# Patient Record
Sex: MALE | Race: WHITE | NOT HISPANIC OR LATINO | URBAN - METROPOLITAN AREA
[De-identification: names, ages, dates, MRNs, and addresses within clinical notes are randomized per-mention and may not be internally consistent; named-entity substitution may affect disease eponyms.]

---

## 2020-04-28 ENCOUNTER — EMERGENCY (EMERGENCY)
Facility: HOSPITAL | Age: 35
LOS: 0 days | Discharge: HOME | End: 2020-04-28
Attending: EMERGENCY MEDICINE
Payer: COMMERCIAL

## 2020-04-28 VITALS
SYSTOLIC BLOOD PRESSURE: 145 MMHG | WEIGHT: 175.05 LBS | RESPIRATION RATE: 18 BRPM | OXYGEN SATURATION: 97 % | HEART RATE: 85 BPM | TEMPERATURE: 98 F | DIASTOLIC BLOOD PRESSURE: 79 MMHG

## 2020-04-28 VITALS
TEMPERATURE: 98 F | HEART RATE: 62 BPM | OXYGEN SATURATION: 98 % | RESPIRATION RATE: 18 BRPM | SYSTOLIC BLOOD PRESSURE: 131 MMHG | DIASTOLIC BLOOD PRESSURE: 71 MMHG

## 2020-04-28 DIAGNOSIS — F41.9 ANXIETY DISORDER, UNSPECIFIED: ICD-10-CM

## 2020-04-28 DIAGNOSIS — R06.02 SHORTNESS OF BREATH: ICD-10-CM

## 2020-04-28 DIAGNOSIS — R06.89 OTHER ABNORMALITIES OF BREATHING: ICD-10-CM

## 2020-04-28 DIAGNOSIS — Z79.1 LONG TERM (CURRENT) USE OF NON-STEROIDAL ANTI-INFLAMMATORIES (NSAID): ICD-10-CM

## 2020-04-28 LAB
ALBUMIN SERPL ELPH-MCNC: 4.6 G/DL — SIGNIFICANT CHANGE UP (ref 3.3–5)
ALP SERPL-CCNC: 35 U/L — LOW (ref 40–120)
ALT FLD-CCNC: 29 U/L — SIGNIFICANT CHANGE UP (ref 12–78)
ANION GAP SERPL CALC-SCNC: 5 MMOL/L — SIGNIFICANT CHANGE UP (ref 5–17)
APTT BLD: 29.4 SEC — SIGNIFICANT CHANGE UP (ref 27.5–36.3)
AST SERPL-CCNC: 15 U/L — SIGNIFICANT CHANGE UP (ref 15–37)
BASOPHILS # BLD AUTO: 0.04 K/UL — SIGNIFICANT CHANGE UP (ref 0–0.2)
BASOPHILS NFR BLD AUTO: 0.5 % — SIGNIFICANT CHANGE UP (ref 0–2)
BILIRUB SERPL-MCNC: 0.6 MG/DL — SIGNIFICANT CHANGE UP (ref 0.2–1.2)
BUN SERPL-MCNC: 19 MG/DL — SIGNIFICANT CHANGE UP (ref 7–23)
CALCIUM SERPL-MCNC: 9.5 MG/DL — SIGNIFICANT CHANGE UP (ref 8.5–10.1)
CHLORIDE SERPL-SCNC: 105 MMOL/L — SIGNIFICANT CHANGE UP (ref 96–108)
CO2 SERPL-SCNC: 30 MMOL/L — SIGNIFICANT CHANGE UP (ref 22–31)
CREAT SERPL-MCNC: 0.97 MG/DL — SIGNIFICANT CHANGE UP (ref 0.5–1.3)
D DIMER BLD IA.RAPID-MCNC: 153 NG/ML DDU — SIGNIFICANT CHANGE UP
EOSINOPHIL # BLD AUTO: 0.09 K/UL — SIGNIFICANT CHANGE UP (ref 0–0.5)
EOSINOPHIL NFR BLD AUTO: 1.2 % — SIGNIFICANT CHANGE UP (ref 0–6)
GLUCOSE SERPL-MCNC: 90 MG/DL — SIGNIFICANT CHANGE UP (ref 70–99)
HCT VFR BLD CALC: 43.1 % — SIGNIFICANT CHANGE UP (ref 39–50)
HGB BLD-MCNC: 14.5 G/DL — SIGNIFICANT CHANGE UP (ref 13–17)
IMM GRANULOCYTES NFR BLD AUTO: 0.3 % — SIGNIFICANT CHANGE UP (ref 0–1.5)
INR BLD: 1.16 RATIO — SIGNIFICANT CHANGE UP (ref 0.88–1.16)
LYMPHOCYTES # BLD AUTO: 1.39 K/UL — SIGNIFICANT CHANGE UP (ref 1–3.3)
LYMPHOCYTES # BLD AUTO: 18.5 % — SIGNIFICANT CHANGE UP (ref 13–44)
MCHC RBC-ENTMCNC: 29.8 PG — SIGNIFICANT CHANGE UP (ref 27–34)
MCHC RBC-ENTMCNC: 33.6 GM/DL — SIGNIFICANT CHANGE UP (ref 32–36)
MCV RBC AUTO: 88.7 FL — SIGNIFICANT CHANGE UP (ref 80–100)
MONOCYTES # BLD AUTO: 0.64 K/UL — SIGNIFICANT CHANGE UP (ref 0–0.9)
MONOCYTES NFR BLD AUTO: 8.5 % — SIGNIFICANT CHANGE UP (ref 2–14)
NEUTROPHILS # BLD AUTO: 5.34 K/UL — SIGNIFICANT CHANGE UP (ref 1.8–7.4)
NEUTROPHILS NFR BLD AUTO: 71 % — SIGNIFICANT CHANGE UP (ref 43–77)
PLATELET # BLD AUTO: 172 K/UL — SIGNIFICANT CHANGE UP (ref 150–400)
POTASSIUM SERPL-MCNC: 3.9 MMOL/L — SIGNIFICANT CHANGE UP (ref 3.5–5.3)
POTASSIUM SERPL-SCNC: 3.9 MMOL/L — SIGNIFICANT CHANGE UP (ref 3.5–5.3)
PROT SERPL-MCNC: 7.7 GM/DL — SIGNIFICANT CHANGE UP (ref 6–8.3)
PROTHROM AB SERPL-ACNC: 12.9 SEC — SIGNIFICANT CHANGE UP (ref 10–12.9)
RBC # BLD: 4.86 M/UL — SIGNIFICANT CHANGE UP (ref 4.2–5.8)
RBC # FLD: 12.4 % — SIGNIFICANT CHANGE UP (ref 10.3–14.5)
SARS-COV-2 RNA SPEC QL NAA+PROBE: SIGNIFICANT CHANGE UP
SODIUM SERPL-SCNC: 140 MMOL/L — SIGNIFICANT CHANGE UP (ref 135–145)
TROPONIN I SERPL-MCNC: <0.015 NG/ML — SIGNIFICANT CHANGE UP (ref 0.01–0.04)
WBC # BLD: 7.52 K/UL — SIGNIFICANT CHANGE UP (ref 3.8–10.5)
WBC # FLD AUTO: 7.52 K/UL — SIGNIFICANT CHANGE UP (ref 3.8–10.5)

## 2020-04-28 PROCEDURE — 93010 ELECTROCARDIOGRAM REPORT: CPT

## 2020-04-28 PROCEDURE — 36415 COLL VENOUS BLD VENIPUNCTURE: CPT

## 2020-04-28 PROCEDURE — 85379 FIBRIN DEGRADATION QUANT: CPT

## 2020-04-28 PROCEDURE — 71045 X-RAY EXAM CHEST 1 VIEW: CPT

## 2020-04-28 PROCEDURE — 80053 COMPREHEN METABOLIC PANEL: CPT

## 2020-04-28 PROCEDURE — 87635 SARS-COV-2 COVID-19 AMP PRB: CPT

## 2020-04-28 PROCEDURE — 85730 THROMBOPLASTIN TIME PARTIAL: CPT

## 2020-04-28 PROCEDURE — 99283 EMERGENCY DEPT VISIT LOW MDM: CPT | Mod: 25

## 2020-04-28 PROCEDURE — 85025 COMPLETE CBC W/AUTO DIFF WBC: CPT

## 2020-04-28 PROCEDURE — 99285 EMERGENCY DEPT VISIT HI MDM: CPT

## 2020-04-28 PROCEDURE — 71045 X-RAY EXAM CHEST 1 VIEW: CPT | Mod: 26

## 2020-04-28 PROCEDURE — 99284 EMERGENCY DEPT VISIT MOD MDM: CPT | Mod: 25

## 2020-04-28 PROCEDURE — 84484 ASSAY OF TROPONIN QUANT: CPT

## 2020-04-28 PROCEDURE — 85610 PROTHROMBIN TIME: CPT

## 2020-04-28 PROCEDURE — 93005 ELECTROCARDIOGRAM TRACING: CPT

## 2020-04-28 NOTE — ED ADULT TRIAGE NOTE - CHIEF COMPLAINT QUOTE
patient presents to Emergency room due to complaints of shortness of breathe. tested negative for the virus with worsening pain on inspiration

## 2020-04-28 NOTE — ED PROVIDER NOTE - PATIENT PORTAL LINK FT
You can access the FollowMyHealth Patient Portal offered by [facility] by registering at the following website: [email or web address]. By joining [facility]’s FollowMyHealth portal, you will also be able to view your health information using other applications (apps) compatible with our system.

## 2020-04-28 NOTE — ED PROVIDER NOTE - PROGRESS NOTE DETAILS
36 y/o m with PMHx of anxiety, lower back surgery presenting to the ED c/o intermittent SOB x1 week. Sx began while he was walking with stroller outside x1 week ago, has been intermittent since, states he feels alright when he inhales but feels discomfort while exhaling. Seen by Dr. Estevez twice, had negative CXR and COVID test, Denies fever, cough, chills. Occasional drinker. No recent travel or surgery. No hx of blood clot to legs. Allergic to Ceclor.    Nicole Pena PA-C Will obtain labs, D-dimer, COVID, reassess.  Nicole Pena PA-C Labs/CXR neg.  Will DC with PMD follow up.  Nicole Pena PA-C

## 2020-04-28 NOTE — ED ADULT NURSE NOTE - OBJECTIVE STATEMENT
PT PRESENTS TO ed FROM HOME, PT ALERT AND ORIENTEDX4, vss AFEBRULKE, PT C/O CHES TPAIN AND sob FOR OVER 1 WEEK. PT STATES HE WA STESTED FOR COVID IT WAS NEGATIVE AND HAD A NEGATIVE CHEST XRAY.  PT STATES HE HAS TALKED TO HIS MD TWO TIMES AND WAS TOLD TO GO TO ED TODAY FOR CONTINUED SYMPTOMS. PT STATES HE TOOK HIS WIFES INAHLER WITH NO RELIEF AND THAT HE AHS ANXIETY. PT STATES HE TOOK MOTRIN FOR DISCOMFORT THIS AM. SAFETY MAINTAINED, PT SPEAKING CLEARLY WITH NO WHEEZING NOTED, WILL CONTINUE TO MONITOR

## 2020-04-28 NOTE — ED ADULT NURSE REASSESSMENT NOTE - NS ED NURSE REASSESS COMMENT FT1
Received patient from previous RN EDGAR Stack.  Pt resting comfortably in stretcher in lowest locked position, VSS.  Pending chest XR, will continue to monitor.

## 2020-04-28 NOTE — ED PROVIDER NOTE - OBJECTIVE STATEMENT
36 y/o m with PMHx of anxiety, lower back surgery presenting to the ED c/o intermittent SOB x1 week. Sx began while he was walking with stroller outside x1 week ago, has been intermittent since, states he feels alright when he inhales but feels discomfort while exhaling. Seen by Dr. Estevez twice, had negative CXR and COVID test, Denies fever, cough, chills. Occasional drinker. No recent travel or surgery. No hx of blood clot to legs. Allergic to Ceclor.

## 2020-04-28 NOTE — ED PROVIDER NOTE - CARE PROVIDER_API CALL
Shyam Estevez)  Family Medicine  [address] [place] [address]  Phone: [phone]  Fax: [phone]  Follow Up Time:

## 2020-04-28 NOTE — ED PROVIDER NOTE - ATTENDING CONTRIBUTION TO CARE
I, Piter Riley MD, personally saw the patient with ACP. I have personally performed a face to face diagnostic evaluation on this patient. I reviewed the ACP note and agree with the history, exam, and plan of care, except as noted.

## 2020-04-28 NOTE — ED PROVIDER NOTE - CLINICAL SUMMARY MEDICAL DECISION MAKING FREE TEXT BOX
Labs including D-dimer, Troponin, CXR, EKG, .re Labs including D-dimer, Troponin, CXR, EKG, .re    Labs/CXR neg.  Will DC with PMD follow up.  Nicole Pean PA-C

## 2020-08-22 ENCOUNTER — EMERGENCY (EMERGENCY)
Facility: HOSPITAL | Age: 35
LOS: 0 days | Discharge: HOME | End: 2020-08-22
Attending: STUDENT IN AN ORGANIZED HEALTH CARE EDUCATION/TRAINING PROGRAM
Payer: COMMERCIAL

## 2020-08-22 VITALS — HEIGHT: 70 IN | WEIGHT: 190.04 LBS

## 2020-08-22 VITALS
TEMPERATURE: 98 F | HEART RATE: 68 BPM | RESPIRATION RATE: 16 BRPM | OXYGEN SATURATION: 98 % | SYSTOLIC BLOOD PRESSURE: 118 MMHG | DIASTOLIC BLOOD PRESSURE: 68 MMHG

## 2020-08-22 DIAGNOSIS — Z88.1 ALLERGY STATUS TO OTHER ANTIBIOTIC AGENTS STATUS: ICD-10-CM

## 2020-08-22 DIAGNOSIS — M79.661 PAIN IN RIGHT LOWER LEG: ICD-10-CM

## 2020-08-22 DIAGNOSIS — M54.31 SCIATICA, RIGHT SIDE: ICD-10-CM

## 2020-08-22 PROBLEM — Z78.9 OTHER SPECIFIED HEALTH STATUS: Chronic | Status: ACTIVE | Noted: 2020-05-01

## 2020-08-22 PROCEDURE — 99284 EMERGENCY DEPT VISIT MOD MDM: CPT | Mod: 25

## 2020-08-22 PROCEDURE — 96374 THER/PROPH/DIAG INJ IV PUSH: CPT

## 2020-08-22 PROCEDURE — 96375 TX/PRO/DX INJ NEW DRUG ADDON: CPT

## 2020-08-22 PROCEDURE — 99284 EMERGENCY DEPT VISIT MOD MDM: CPT

## 2020-08-22 RX ORDER — HYDROMORPHONE HYDROCHLORIDE 2 MG/ML
1 INJECTION INTRAMUSCULAR; INTRAVENOUS; SUBCUTANEOUS ONCE
Refills: 0 | Status: DISCONTINUED | OUTPATIENT
Start: 2020-08-22 | End: 2020-08-22

## 2020-08-22 RX ORDER — IBUPROFEN 200 MG
1 TABLET ORAL
Qty: 12 | Refills: 0
Start: 2020-08-22 | End: 2020-08-25

## 2020-08-22 RX ORDER — DIAZEPAM 5 MG
5 TABLET ORAL ONCE
Refills: 0 | Status: DISCONTINUED | OUTPATIENT
Start: 2020-08-22 | End: 2020-08-22

## 2020-08-22 RX ORDER — DIAZEPAM 5 MG
1 TABLET ORAL
Qty: 9 | Refills: 0
Start: 2020-08-22 | End: 2020-08-24

## 2020-08-22 RX ORDER — KETOROLAC TROMETHAMINE 30 MG/ML
30 SYRINGE (ML) INJECTION ONCE
Refills: 0 | Status: DISCONTINUED | OUTPATIENT
Start: 2020-08-22 | End: 2020-08-22

## 2020-08-22 RX ORDER — LIDOCAINE 4 G/100G
1 CREAM TOPICAL
Qty: 10 | Refills: 0
Start: 2020-08-22 | End: 2020-08-31

## 2020-08-22 RX ORDER — LIDOCAINE 4 G/100G
1 CREAM TOPICAL ONCE
Refills: 0 | Status: COMPLETED | OUTPATIENT
Start: 2020-08-22 | End: 2020-08-22

## 2020-08-22 RX ORDER — SODIUM CHLORIDE 9 MG/ML
1000 INJECTION INTRAMUSCULAR; INTRAVENOUS; SUBCUTANEOUS ONCE
Refills: 0 | Status: COMPLETED | OUTPATIENT
Start: 2020-08-22 | End: 2020-08-22

## 2020-08-22 RX ORDER — ACETAMINOPHEN 500 MG
975 TABLET ORAL ONCE
Refills: 0 | Status: COMPLETED | OUTPATIENT
Start: 2020-08-22 | End: 2020-08-22

## 2020-08-22 RX ADMIN — SODIUM CHLORIDE 1000 MILLILITER(S): 9 INJECTION INTRAMUSCULAR; INTRAVENOUS; SUBCUTANEOUS at 07:52

## 2020-08-22 RX ADMIN — Medication 30 MILLIGRAM(S): at 07:51

## 2020-08-22 RX ADMIN — Medication 5 MILLIGRAM(S): at 07:51

## 2020-08-22 RX ADMIN — Medication 975 MILLIGRAM(S): at 07:51

## 2020-08-22 RX ADMIN — LIDOCAINE 1 PATCH: 4 CREAM TOPICAL at 07:52

## 2020-08-22 RX ADMIN — HYDROMORPHONE HYDROCHLORIDE 1 MILLIGRAM(S): 2 INJECTION INTRAMUSCULAR; INTRAVENOUS; SUBCUTANEOUS at 08:53

## 2020-08-22 NOTE — ED ADULT NURSE NOTE - OBJECTIVE STATEMENT
patient had microdiscectomy l5 S1, 3 years ago.  states he had sciatic pain that started on Sunday and has become progressively worse.  pain in right gluteus radiating into his knee, with tightness in his calf

## 2020-08-22 NOTE — ED ADULT TRIAGE NOTE - CHIEF COMPLAINT QUOTE
Pt presents to ED c/o sciatica pain. Pt reports pain x 4 days, worsened today. Denies injury, dizziness, weakness

## 2020-08-22 NOTE — ED ADULT NURSE NOTE - NSIMPLEMENTINTERV_GEN_ALL_ED
Implemented All Universal Safety Interventions:  [place] to call system. Call bell, personal items and telephone within reach. Instruct patient to call for assistance. Room bathroom lighting operational. Non-slip footwear when patient is off stretcher. Physically safe environment: no spills, clutter or unnecessary equipment. Stretcher in lowest position, wheels locked, appropriate side rails in place.

## 2020-08-22 NOTE — ED PROVIDER NOTE - PROGRESS NOTE DETAILS
Tabby BLUM: Patient still with mild pain but improved; will re-medicate and re-eval. Tabby DO: Patient still with pain but improved; able to lay down in stretcher- more comfortable; offered CT scan lumbar spine but wanting MRI- instructed that no emergent indication for MRI at this time; defers CT scan at this time; patient has a pain management specialist and spine specialist that he has seen in the past and will f/u in 1-2 days without fail; patient was ambulatory in ED upon arrival; strict return precautions given.

## 2020-08-22 NOTE — ED PROVIDER NOTE - MUSCULOSKELETAL, MLM
Spine appears normal, range of motion is not limited, (+) mild tenderness to right buttocks; strength/sensation intact in upper/lower extremities

## 2020-08-22 NOTE — ED ADULT TRIAGE NOTE - ACCOMPANIED BY
Self I have personally seen and examined this patient.  I have fully participated in the care of this patient. I have reviewed all pertinent clinical information, including history, physical exam, plan and the Resident’s note and agree except as noted.

## 2020-08-22 NOTE — ED PROVIDER NOTE - PATIENT PORTAL LINK FT
You can access the FollowMyHealth Patient Portal offered by [facility] by registering at the following website: [email or web address]. By joining [facility]’s FollowMyHealth portal, you will also be able to view your health information using other applications (apps) compatible with our system.

## 2020-08-22 NOTE — ED PROVIDER NOTE - OBJECTIVE STATEMENT
Patient is a 36 yo male who reports remote hx of L5-S1 microdiscectomy; reports 1 week of right lower leg pain from buttocks to foot; mild numbness to right buttocks; no tingling; no weakness in arms or legs; no incontinence of bowel or bladder function; patient has taken no medications for pain; no fever or chills; no urinary complaints.

## 2020-08-22 NOTE — ED PROVIDER NOTE - CARE PROVIDER_API CALL
Miguel Cabello  NEUROSURGERY  [address] [place] [address]  Phone: [phone]  Fax: [phone]  Follow Up Time:

## 2020-08-26 PROBLEM — Z00.00 ENCOUNTER FOR PREVENTIVE HEALTH EXAMINATION: Status: ACTIVE | Noted: 2020-08-26

## 2023-02-08 NOTE — ED ADULT NURSE NOTE - NS TRANSFER PATIENT BELONGINGS
Clothing Niacinamide Pregnancy And Lactation Text: These medications are considered safe during pregnancy.

## 2023-03-26 NOTE — ED ADULT NURSE NOTE - NS ED NURSE DC INFO COMPLEXITY
Patient did not come with any belongings. Monica (wife) took them. [phone].          Behavioral Health Belongings     Informed of Valuables Policy  Yes No         Item                  Qty   Clothing:     Home   Bin   Security Lock Up Room Returned   Date/Initials    Pants with strings         Shirts with strings         Shoes / boots with strings         Coats / jackets with strings         Other                               Behavioral Health Belongings List                     [id number]                          Qty       Luggage / Bags:   Home   Bin   Security Lock Up Room Returned Date/Initials    Suitcases / Luggage with straps         Gym Bags / purses with long straps         Backpack         Plastic bags (including Ziploc)         Other                     Qty   Grooming / Hygiene Items:   Home   Bin   Security Lock Up Room Returned Date/Initials    Perfume / [place]         Mouthwash         Items in glass bottles (nail polish, makeup, etc.)         Aerosol cans (hair spray, mousse, etc         Electric razor         Disposable razor         Shaving cream         Dental floss         Metal files, nail clippers, tweezers, etc.         Hairdryer         Curling iron         Other                                                             Behavioral Health Belongings List                            [id number]                               Qty   Electronic Devices:   Home   Bin   Security Lock Up Room Returned Date/Initials    Cell phone, iPhone, Blackberry, etc.         iPod / Media player         Chargers / cords         Headphones         Other                              Qty   Smoking Items:   Home   Bin   Security Lock Up Room Returned Date/Initials    Cigarettes, cigars         Lighters         Matches         Loose tobacco for rolling cigarettes, Chew, Pipe tobacco         Tobacco pipes         Rolling Papers         Other                                                  Behavioral Health Belongings  List                          [id number]                                                   .                                .            Qty   Cards, Keys, Valuables:   Home   Bin   Security Lock Up Room Returned Date/Initials    Keys         Wallet / Purse         Cash (Encourage  to home or Loss Prevention)         Credit Cards (Encourage home or Loss Prevention)         Checkbook (Encourage home or Loss Prevention)         Jewelry - Specify         Other                              Qty   Other / Various Sharps:   Home   Bin   Security Lock Up Room Returned Date/Initials    Brace         Cane         Contacts         Dentures U / L         Hearing Aide L / R         Partials / Retainer         Prothesis         Evelio         Walker         W/C         Medication         Other                                                                             Behavioral Health Belongings List                            [id number]                           Qty   Other Misc Items   Home   Bin   Security Lock Up Room Returned Date/Initials                                                                    Signature at Admission ___________________________________________Date: _________    Signature at Update ______________________________________________Date: _________    Signature at Discharge ____________________________________________Date: _________                                                                    Behavioral Health Belongings List                       [id number]          Simple: Patient demonstrates quick and easy understanding
